# Patient Record
Sex: MALE | Race: BLACK OR AFRICAN AMERICAN | NOT HISPANIC OR LATINO | Employment: OTHER | URBAN - METROPOLITAN AREA
[De-identification: names, ages, dates, MRNs, and addresses within clinical notes are randomized per-mention and may not be internally consistent; named-entity substitution may affect disease eponyms.]

---

## 2022-03-06 ENCOUNTER — HOSPITAL ENCOUNTER (EMERGENCY)
Facility: HOSPITAL | Age: 52
Discharge: HOME | End: 2022-03-06
Attending: EMERGENCY MEDICINE
Payer: MEDICARE

## 2022-03-06 VITALS
RESPIRATION RATE: 15 BRPM | HEIGHT: 71 IN | OXYGEN SATURATION: 96 % | BODY MASS INDEX: 28 KG/M2 | TEMPERATURE: 98 F | DIASTOLIC BLOOD PRESSURE: 81 MMHG | WEIGHT: 200 LBS | SYSTOLIC BLOOD PRESSURE: 148 MMHG | HEART RATE: 60 BPM

## 2022-03-06 DIAGNOSIS — I10 ACCELERATED HYPERTENSION: Primary | ICD-10-CM

## 2022-03-06 DIAGNOSIS — R10.9 ABDOMINAL PAIN: ICD-10-CM

## 2022-03-06 LAB
ALBUMIN SERPL BCP-MCNC: 4 G/DL (ref 3.5–5.2)
ALP SERPL-CCNC: 93 U/L (ref 55–135)
ALT SERPL W/O P-5'-P-CCNC: 21 U/L (ref 10–44)
AMYLASE SERPL-CCNC: 40 U/L (ref 20–110)
ANION GAP SERPL CALC-SCNC: 7 MMOL/L (ref 8–16)
APTT PPP: 33 SEC (ref 23.3–35.1)
AST SERPL-CCNC: 21 U/L (ref 10–40)
BASOPHILS # BLD AUTO: 0.04 K/UL (ref 0–0.2)
BASOPHILS NFR BLD: 0.5 % (ref 0–1.9)
BILIRUB SERPL-MCNC: 0.8 MG/DL (ref 0.1–1)
BILIRUB UR QL STRIP: NEGATIVE
BUN SERPL-MCNC: 8 MG/DL (ref 6–20)
CALCIUM SERPL-MCNC: 9.2 MG/DL (ref 8.7–10.5)
CHLORIDE SERPL-SCNC: 102 MMOL/L (ref 95–110)
CK MB SERPL-MCNC: 3.1 NG/ML (ref 0.1–6.5)
CK SERPL-CCNC: 195 U/L (ref 20–200)
CLARITY UR: ABNORMAL
CO2 SERPL-SCNC: 26 MMOL/L (ref 23–29)
COLOR UR: YELLOW
CREAT SERPL-MCNC: 0.9 MG/DL (ref 0.5–1.4)
DIFFERENTIAL METHOD: ABNORMAL
EOSINOPHIL # BLD AUTO: 0.1 K/UL (ref 0–0.5)
EOSINOPHIL NFR BLD: 1.4 % (ref 0–8)
ERYTHROCYTE [DISTWIDTH] IN BLOOD BY AUTOMATED COUNT: 14.8 % (ref 11.5–14.5)
EST. GFR  (AFRICAN AMERICAN): >60 ML/MIN/1.73 M^2
EST. GFR  (NON AFRICAN AMERICAN): >60 ML/MIN/1.73 M^2
GLUCOSE SERPL-MCNC: 162 MG/DL (ref 70–110)
GLUCOSE UR QL STRIP: ABNORMAL
HCT VFR BLD AUTO: 53.3 % (ref 40–54)
HGB BLD-MCNC: 17.3 G/DL (ref 14–18)
HGB UR QL STRIP: NEGATIVE
IMM GRANULOCYTES # BLD AUTO: 0.02 K/UL (ref 0–0.04)
IMM GRANULOCYTES NFR BLD AUTO: 0.3 % (ref 0–0.5)
INR PPP: 1.2
KETONES UR QL STRIP: ABNORMAL
LEUKOCYTE ESTERASE UR QL STRIP: NEGATIVE
LIPASE SERPL-CCNC: 38 U/L (ref 4–60)
LYMPHOCYTES # BLD AUTO: 2.3 K/UL (ref 1–4.8)
LYMPHOCYTES NFR BLD: 30.8 % (ref 18–48)
MAGNESIUM SERPL-MCNC: 1.9 MG/DL (ref 1.6–2.6)
MCH RBC QN AUTO: 27.3 PG (ref 27–31)
MCHC RBC AUTO-ENTMCNC: 32.5 G/DL (ref 32–36)
MCV RBC AUTO: 84 FL (ref 82–98)
MONOCYTES # BLD AUTO: 0.7 K/UL (ref 0.3–1)
MONOCYTES NFR BLD: 9.4 % (ref 4–15)
NEUTROPHILS # BLD AUTO: 4.4 K/UL (ref 1.8–7.7)
NEUTROPHILS NFR BLD: 57.6 % (ref 38–73)
NITRITE UR QL STRIP: NEGATIVE
NRBC BLD-RTO: 0 /100 WBC
PH UR STRIP: 8 [PH] (ref 5–8)
PLATELET # BLD AUTO: 271 K/UL (ref 150–450)
PMV BLD AUTO: 10.4 FL (ref 9.2–12.9)
POTASSIUM SERPL-SCNC: 4.2 MMOL/L (ref 3.5–5.1)
PROT SERPL-MCNC: 7.6 G/DL (ref 6–8.4)
PROT UR QL STRIP: ABNORMAL
PROTHROMBIN TIME: 14.2 SEC (ref 11.4–13.7)
RBC # BLD AUTO: 6.33 M/UL (ref 4.6–6.2)
SARS-COV-2 RDRP RESP QL NAA+PROBE: NEGATIVE
SODIUM SERPL-SCNC: 135 MMOL/L (ref 136–145)
SP GR UR STRIP: 1.01 (ref 1–1.03)
TROPONIN I SERPL DL<=0.01 NG/ML-MCNC: <0.03 NG/ML
URN SPEC COLLECT METH UR: ABNORMAL
UROBILINOGEN UR STRIP-ACNC: NEGATIVE EU/DL
WBC # BLD AUTO: 7.59 K/UL (ref 3.9–12.7)

## 2022-03-06 PROCEDURE — 85730 THROMBOPLASTIN TIME PARTIAL: CPT | Performed by: EMERGENCY MEDICINE

## 2022-03-06 PROCEDURE — 80053 COMPREHEN METABOLIC PANEL: CPT | Performed by: EMERGENCY MEDICINE

## 2022-03-06 PROCEDURE — 96375 TX/PRO/DX INJ NEW DRUG ADDON: CPT

## 2022-03-06 PROCEDURE — 81003 URINALYSIS AUTO W/O SCOPE: CPT | Performed by: EMERGENCY MEDICINE

## 2022-03-06 PROCEDURE — 82550 ASSAY OF CK (CPK): CPT | Performed by: EMERGENCY MEDICINE

## 2022-03-06 PROCEDURE — 83690 ASSAY OF LIPASE: CPT | Performed by: EMERGENCY MEDICINE

## 2022-03-06 PROCEDURE — 93005 ELECTROCARDIOGRAM TRACING: CPT | Performed by: INTERNAL MEDICINE

## 2022-03-06 PROCEDURE — 83735 ASSAY OF MAGNESIUM: CPT | Performed by: EMERGENCY MEDICINE

## 2022-03-06 PROCEDURE — 93010 ELECTROCARDIOGRAM REPORT: CPT | Mod: ,,, | Performed by: INTERNAL MEDICINE

## 2022-03-06 PROCEDURE — 25000003 PHARM REV CODE 250: Performed by: EMERGENCY MEDICINE

## 2022-03-06 PROCEDURE — 63600175 PHARM REV CODE 636 W HCPCS: Performed by: EMERGENCY MEDICINE

## 2022-03-06 PROCEDURE — 93010 EKG 12-LEAD: ICD-10-PCS | Mod: ,,, | Performed by: INTERNAL MEDICINE

## 2022-03-06 PROCEDURE — 99285 EMERGENCY DEPT VISIT HI MDM: CPT | Mod: 25

## 2022-03-06 PROCEDURE — 85025 COMPLETE CBC W/AUTO DIFF WBC: CPT | Performed by: EMERGENCY MEDICINE

## 2022-03-06 PROCEDURE — 82553 CREATINE MB FRACTION: CPT | Performed by: EMERGENCY MEDICINE

## 2022-03-06 PROCEDURE — 82150 ASSAY OF AMYLASE: CPT | Performed by: EMERGENCY MEDICINE

## 2022-03-06 PROCEDURE — 25500020 PHARM REV CODE 255: Performed by: EMERGENCY MEDICINE

## 2022-03-06 PROCEDURE — 96374 THER/PROPH/DIAG INJ IV PUSH: CPT

## 2022-03-06 PROCEDURE — U0002 COVID-19 LAB TEST NON-CDC: HCPCS | Performed by: EMERGENCY MEDICINE

## 2022-03-06 PROCEDURE — 85610 PROTHROMBIN TIME: CPT | Performed by: EMERGENCY MEDICINE

## 2022-03-06 PROCEDURE — 96361 HYDRATE IV INFUSION ADD-ON: CPT

## 2022-03-06 PROCEDURE — 84484 ASSAY OF TROPONIN QUANT: CPT | Performed by: EMERGENCY MEDICINE

## 2022-03-06 RX ORDER — AMITRIPTYLINE HYDROCHLORIDE 25 MG/1
25 TABLET, FILM COATED ORAL NIGHTLY PRN
COMMUNITY

## 2022-03-06 RX ORDER — DULOXETIN HYDROCHLORIDE 30 MG/1
30 CAPSULE, DELAYED RELEASE ORAL DAILY
COMMUNITY

## 2022-03-06 RX ORDER — PANTOPRAZOLE SODIUM 20 MG/1
40 TABLET, DELAYED RELEASE ORAL DAILY
Qty: 60 TABLET | Refills: 11 | Status: SHIPPED | OUTPATIENT
Start: 2022-03-06 | End: 2023-03-06

## 2022-03-06 RX ORDER — HYDROXYZINE PAMOATE 25 MG/1
25 CAPSULE ORAL NIGHTLY
COMMUNITY

## 2022-03-06 RX ORDER — ATORVASTATIN CALCIUM 40 MG/1
40 TABLET, FILM COATED ORAL DAILY
COMMUNITY

## 2022-03-06 RX ORDER — GABAPENTIN 300 MG/1
300 CAPSULE ORAL 2 TIMES DAILY
COMMUNITY

## 2022-03-06 RX ORDER — EMPAGLIFLOZIN 25 MG/1
25 TABLET, FILM COATED ORAL DAILY
COMMUNITY

## 2022-03-06 RX ORDER — METFORMIN HYDROCHLORIDE 1000 MG/1
1000 TABLET ORAL 2 TIMES DAILY WITH MEALS
COMMUNITY

## 2022-03-06 RX ORDER — ONDANSETRON 4 MG/1
4 TABLET, FILM COATED ORAL EVERY 8 HOURS PRN
Qty: 12 TABLET | Refills: 0 | Status: SHIPPED | OUTPATIENT
Start: 2022-03-06

## 2022-03-06 RX ORDER — ONDANSETRON 2 MG/ML
4 INJECTION INTRAMUSCULAR; INTRAVENOUS
Status: COMPLETED | OUTPATIENT
Start: 2022-03-06 | End: 2022-03-06

## 2022-03-06 RX ORDER — HYDRALAZINE HYDROCHLORIDE 20 MG/ML
10 INJECTION INTRAMUSCULAR; INTRAVENOUS
Status: COMPLETED | OUTPATIENT
Start: 2022-03-06 | End: 2022-03-06

## 2022-03-06 RX ORDER — CARVEDILOL 25 MG/1
25 TABLET ORAL 2 TIMES DAILY WITH MEALS
COMMUNITY

## 2022-03-06 RX ORDER — CARVEDILOL 12.5 MG/1
12.5 TABLET ORAL 2 TIMES DAILY WITH MEALS
Qty: 60 TABLET | Refills: 0 | Status: SHIPPED | OUTPATIENT
Start: 2022-03-06 | End: 2023-03-06

## 2022-03-06 RX ORDER — HYDROMORPHONE HYDROCHLORIDE 1 MG/ML
1 INJECTION, SOLUTION INTRAMUSCULAR; INTRAVENOUS; SUBCUTANEOUS
Status: COMPLETED | OUTPATIENT
Start: 2022-03-06 | End: 2022-03-06

## 2022-03-06 RX ORDER — FAMOTIDINE 10 MG/ML
20 INJECTION INTRAVENOUS
Status: COMPLETED | OUTPATIENT
Start: 2022-03-06 | End: 2022-03-06

## 2022-03-06 RX ADMIN — FAMOTIDINE 20 MG: 10 INJECTION, SOLUTION INTRAVENOUS at 11:03

## 2022-03-06 RX ADMIN — SODIUM CHLORIDE 1000 ML: 0.9 INJECTION, SOLUTION INTRAVENOUS at 11:03

## 2022-03-06 RX ADMIN — HYDROMORPHONE HYDROCHLORIDE 1 MG: 1 INJECTION, SOLUTION INTRAMUSCULAR; INTRAVENOUS; SUBCUTANEOUS at 11:03

## 2022-03-06 RX ADMIN — HYDRALAZINE HYDROCHLORIDE 10 MG: 20 INJECTION, SOLUTION INTRAMUSCULAR; INTRAVENOUS at 11:03

## 2022-03-06 RX ADMIN — IOHEXOL 100 ML: 350 INJECTION, SOLUTION INTRAVENOUS at 12:03

## 2022-03-06 RX ADMIN — ONDANSETRON 4 MG: 2 INJECTION INTRAMUSCULAR; INTRAVENOUS at 11:03

## 2022-03-06 NOTE — DISCHARGE INSTRUCTIONS
You need further evaluation with Gastroenterology.  You may need endoscopy to rule out peptic ulcer disease or other pathology.

## 2022-03-06 NOTE — ED PROVIDER NOTES
Encounter Date: 3/6/2022       History     Chief Complaint   Patient presents with    Abdominal Pain     Begins with RUQ abd pain radiates to epigastric pain x 2-3 days, worsening with eating      Patient with approximately 1 month history of mostly right upper quadrant abdominal pain.  Pain usually begins after eating.  Over last 3 days pain has been more constant.  Patient with multiple episodes of emesis.  Pain radiates to epigastric and lower sternal area.  No similar symptoms in the past.  Patient has been noncompliant with all his medications including blood pressure medication for proximally 1 month.  Patient denies any shortness of breath.  No gastrointestinal.  No pleurisy hemoptysis.        Review of patient's allergies indicates:  No Known Allergies  Past Medical History:   Diagnosis Date    Coronary artery disease     GERD (gastroesophageal reflux disease)     Hypertension      Past Surgical History:   Procedure Laterality Date    CARDIAC PACEMAKER PLACEMENT       No family history on file.  Social History     Tobacco Use    Smoking status: Current Every Day Smoker     Packs/day: 1.00   Substance Use Topics    Alcohol use: Yes    Drug use: No     Review of Systems   Constitutional: Negative for chills and fever.   HENT: Negative for sore throat.    Eyes: Negative for photophobia and visual disturbance.   Respiratory: Negative for shortness of breath.    Cardiovascular: Positive for chest pain.   Gastrointestinal: Positive for abdominal pain, nausea and vomiting.   Genitourinary: Negative for dysuria.   Musculoskeletal: Negative for joint swelling.   Skin: Negative for rash.   Neurological: Negative for weakness and headaches.   Psychiatric/Behavioral: Negative for confusion.       Physical Exam     Initial Vitals [03/06/22 1052]   BP Pulse Resp Temp SpO2   (!) 220/123 60 18 98.1 °F (36.7 °C) 99 %      MAP       --         Physical Exam    Nursing note and vitals reviewed.  Constitutional: He is  not diaphoretic. No distress.   HENT:   Head: Normocephalic and atraumatic.   Eyes: Conjunctivae are normal.   Neck:   Normal range of motion.  Cardiovascular: Regular rhythm.   Pulmonary/Chest: Breath sounds normal.   Left upper chest wall with pacemaker/AICD in place.   Abdominal: Abdomen is soft. Bowel sounds are normal.   Moderate right upper quadrant abdominal tenderness.   Musculoskeletal:         General: Normal range of motion.      Cervical back: Normal range of motion.     Skin: No rash noted.   Psychiatric: He has a normal mood and affect.         ED Course   Critical Care    Date/Time: 3/6/2022 1:35 PM  Performed by: Paul Yan MD  Authorized by: Paul Yan MD   Direct patient critical care time: 15 minutes  Additional history critical care time: 5 minutes  Ordering / reviewing critical care time: 5 minutes  Documentation critical care time: 5 minutes  Consulting other physicians critical care time: 5 minutes  Total critical care time (exclusive of procedural time) : 35 minutes        Labs Reviewed   CBC W/ AUTO DIFFERENTIAL - Abnormal; Notable for the following components:       Result Value    RBC 6.33 (*)     RDW 14.8 (*)     All other components within normal limits   COMPREHENSIVE METABOLIC PANEL - Abnormal; Notable for the following components:    Sodium 135 (*)     Glucose 162 (*)     Anion Gap 7 (*)     All other components within normal limits   PROTIME-INR - Abnormal; Notable for the following components:    PT 14.2 (*)     All other components within normal limits   URINALYSIS, REFLEX TO URINE CULTURE - Abnormal; Notable for the following components:    Appearance, UA Hazy (*)     Protein, UA Trace (*)     Glucose, UA Trace (*)     Ketones, UA 1+ (*)     All other components within normal limits    Narrative:     Specimen Source->Urine   SARS-COV-2 RNA AMPLIFICATION, QUAL   APTT   AMYLASE   LIPASE   MAGNESIUM   TROPONIN I   CK-MB   CK        ECG Results          EKG 12-lead (In  process)  Result time 03/06/22 11:24:45    In process by Interface, Lab In [facility] (03/06/22 11:24:45)                 Narrative:    Test Reason : R10.9,    Vent. Rate : 060 BPM     Atrial Rate : 060 BPM     P-R Int : 184 ms          QRS Dur : 086 ms      QT Int : 428 ms       P-R-T Axes : 010 044 037 degrees     QTc Int : 428 ms    Atrial-paced rhythm  Abnormal ECG  No previous ECGs available    Referred By: [email or web address]   SELF           Confirmed By:                             Imaging Results          CT Abdomen Pelvis With Contrast (Final result)  Result time 03/06/22 13:06:21    Final result by Mayo Steele MD (03/06/22 13:06:21)                 Narrative:    CMS MANDATED QUALITY DATA - CT RADIATION  436    All CT scans at this facility utilize dose modulation, iterative reconstruction, and/or weight based dosing when appropriate to reduce radiation dose to as low as reasonably achievable.    CLINICAL HISTORY:  52 years (1970) Male Abdominal abscess/infection suspected    TECHNIQUE:  CT ABDOMEN PELVIS WITH IV CONTRAST. 286 images obtained. Axial CT images of the abdomen and pelvis were obtained from the dome of the diaphragm to the proximal thigh.    CONTRAST:  100 mL of IV Omni 350 was administered uneventfully by IV    COMPARISON:  None available.    FINDINGS:.  Lower Thorax:  The lungs are essentially clear noting linear dependent atelectasis versus scarring. There is no pleural or pericardial effusion on these images. The heart is normal in size.    CT Abdomen:  Liver: Relative diffuse low-attenuation liver consistent with hepatic steatosis.  Gallbladder: The gallbladder is within normal limits.  Biliary Tree: No intra or extrahepatic ductal dilation.  Spleen: Within normal limits.  Pancreas: No peripancreatic fat stranding, ductal dilation or discrete mass is identified.  Adrenal Glands: Indeterminate enhancing left adrenal nodule measuring 12 mm in diameter (image 52) right adrenal gland is within  normal limits. This can be further assessed with nonemergent outpatient adrenal protocol MRI/CT.  Kidneys: The kidneys are normal in imaging appearance without hydronephrosis or hydroureter.  Vasculature: Atheromatous calcified plaques are seen in the abdominal aorta and iliacs with no aneurysm.  Lymph nodes: No abdominal lymphadenopathy is seen.  Intraperitoneal structures: There is no ascites.  Bowel: The partially filled bowel is within normal limits with a nonobstructive bowel gas pattern. The appendix is normal (image 143)  Abdominal wall: The abdominal wall and musculature are normal.  Musculoskeletal: There is degenerative disc disease and facet arthropathy in the lumbar spine with no aggressive appearing lytic or blastic lesions .    CT Pelvis:  Bladder: The bladder is partially decompressed. That being said, there is mild circumferential bladder wall thickening. In the appropriate clinical setting this may relate to chronic outlet obstruction (prostate enlarged) or a mild degree of infection or inflammation; consider correlation with urinalysis.  Reproductive Organs: The prostate is heterogeneous and moderately enlarged.  Pelvic Lymph nodes: No pelvic lymphadenopathy or pelvic mass is identified.    IMPRESSION:  1. Hepatic parenchymal findings suggesting steatosis.  2. Heterogeneous moderately enlarged prostate gland.  3. Scattered calcified plaques in the abdominal aorta and iliacs with no aneurysm.  4. Indeterminate left adrenal nodule entering 12 mm.  5. Additional, and incidental findings as noted above.                    .    Electronically signed by:  Mayo Steele MD  3/6/2022 1:06 PM [facility] Workstation: [id number]                             X-Ray Chest AP Portable (Final result)  Result time 03/06/22 11:39:06    Final result by Sharmaine Mcclure MD (03/06/22 11:39:06)                 Narrative:    Portable chest x-ray at 11:39 AM is compared to prior study dated 3/31/2008    Clinical history is  pain    The cardiomediastinal silhouette is normal in size. There is a left subclavian vein pacemaker with lead tips overlying the right atrium and right ventricle. Lungs are clear. There are no acute osseous modalities.    IMPRESSION: No acute pulmonary process    Electronically signed by:  Sharmaine Mcclure MD  3/6/2022 11:39 AM CST Workstation: [id number]                             US Abdomen Limited (Final result)  Result time 03/06/22 11:35:40    Final result by Sharmaine Mcclure MD (03/06/22 11:35:40)                 Narrative:    Reason: Right upper quadrant abdominal pain    COMPARISON: None    FINDINGS:  Liver maintains normal echogenicity without focal hepatic mass or intrahepatic bile duct dilation. Hepatopedal flow present in main portal vein.    Gallbladder is normal. Common duct diameter of 4 mm is normal.    Visualized pancreas is unremarkable with bowel gas obscuring portions of pancreas. Right kidney is free of hydronephrosis.    Juxtahepatic IVC is unremarkable. Visualized abdominal aorta is nonaneurysmal.    IMPRESSION:  Normal right upper quadrant ultrasound.    Electronically signed by:  Sharmaine Mcclure MD  3/6/2022 11:35 AM CST Workstation: [id number]                               Medications   famotidine (PF) injection 20 mg (20 mg Intravenous Given 3/6/22 1126)   ondansetron injection 4 mg (4 mg Intravenous Given 3/6/22 1124)   HYDROmorphone injection 1 mg (1 mg Intravenous Given 3/6/22 1125)   sodium chloride 0.9% bolus 1,000 mL (0 mLs Intravenous Stopped 3/6/22 1302)   hydrALAZINE injection 10 mg (10 mg Intravenous Given 3/6/22 1127)   iohexoL (OMNIPAQUE 350) injection 100 mL (100 mLs Intravenous Given 3/6/22 1232)     Medical Decision Making:   History:   Old Medical Records: I decided to obtain old medical records.  Clinical Tests:   Lab Tests: Reviewed  Radiological Study: Reviewed  Medical Tests: Reviewed  ED Management:  Patient presents with upper abdominal pain of unclear  etiology.  After treatment here patient is feeling better.  Patient is tolerating p.o..  No evidence of cardiac etiology of abdominal pain despite patient's extensive cardiac history.  Will refer to Gastroenterology.  Will begin treatment for possible peptic ulcer disease with PPI.  Treat symptoms with Zofran.  Patient has been noncompliant with his hypertension medication.  Patient is post be on carvedilol.  Will restart COVID a wall.  Blood pressure much improved after IV hydralazine.                      Clinical Impression:   Final diagnoses:  [R10.9] Abdominal pain  [I10] Accelerated hypertension (Primary)          ED Disposition Condition    Discharge Stable        ED Prescriptions     Medication Sig Dispense Start Date End Date Auth. Provider    ondansetron (ZOFRAN) 4 MG tablet Take 1 tablet (4 mg total) by mouth every 8 (eight) hours as needed for Nausea. 12 tablet 3/6/2022  Paul Yan MD    pantoprazole (PROTONIX) 20 MG tablet Take 2 tablets (40 mg total) by mouth once daily. 60 tablet 3/6/2022 3/6/2023 Paul Yan MD        Follow-up Information     Follow up With Specialties Details Why Contact Info Additional Information    On license of [facility] - Emergency Dept Emergency Medicine  If symptoms worsen [address]  [facility] [id number] [phone] 1st floor    Brian Landeros MD Gastroenterology Schedule an appointment as soon as possible for a visit   [address] [facility] [id number]  [phone]              Paul Yan MD  03/06/22 2057

## 2022-03-06 NOTE — ED NOTES
Pt c/o RUQ abdominal pain and emesis x1 month worsening over the last 3 days, pain rated 10/10. Pt is hypertensive w/ otherwise stable vital signs w patent and protected airway, NAD, and is AOx4.